# Patient Record
Sex: FEMALE | Race: BLACK OR AFRICAN AMERICAN | Employment: OTHER | URBAN - METROPOLITAN AREA
[De-identification: names, ages, dates, MRNs, and addresses within clinical notes are randomized per-mention and may not be internally consistent; named-entity substitution may affect disease eponyms.]

---

## 2017-01-12 PROBLEM — M17.0 OSTEOARTHRITIS OF BOTH KNEES, UNSPECIFIED OSTEOARTHRITIS TYPE: Status: ACTIVE | Noted: 2017-01-12

## 2017-09-01 PROCEDURE — 83970 ASSAY OF PARATHORMONE: CPT | Performed by: INTERNAL MEDICINE

## 2017-09-19 PROBLEM — M79.10 MYALGIA: Status: ACTIVE | Noted: 2017-09-19

## 2018-04-09 PROCEDURE — 86704 HEP B CORE ANTIBODY TOTAL: CPT | Performed by: INTERNAL MEDICINE

## 2018-04-09 PROCEDURE — 80074 ACUTE HEPATITIS PANEL: CPT | Performed by: INTERNAL MEDICINE

## 2018-04-09 PROCEDURE — 36415 COLL VENOUS BLD VENIPUNCTURE: CPT | Performed by: INTERNAL MEDICINE

## 2018-09-06 PROBLEM — M05.79 RHEUMATOID ARTHRITIS INVOLVING MULTIPLE SITES WITH POSITIVE RHEUMATOID FACTOR (HCC): Status: ACTIVE | Noted: 2018-09-06

## 2019-11-19 PROBLEM — K86.2 PANCREATIC CYST: Status: ACTIVE | Noted: 2019-11-19

## 2020-07-22 NOTE — PAT NURSING NOTE
Patient states she will not be able to make it to [facility] before 2:30 today. Patient will require rapid covid test done prior to procedure start.

## 2020-07-23 ENCOUNTER — ANESTHESIA EVENT (OUTPATIENT)
Dept: ENDOSCOPY | Facility: HOSPITAL | Age: 75
End: 2020-07-23
Payer: MEDICARE

## 2020-07-23 ENCOUNTER — ANESTHESIA (OUTPATIENT)
Dept: ENDOSCOPY | Facility: HOSPITAL | Age: 75
End: 2020-07-23
Payer: MEDICARE

## 2020-07-23 ENCOUNTER — HOSPITAL ENCOUNTER (OUTPATIENT)
Facility: HOSPITAL | Age: 75
Setting detail: HOSPITAL OUTPATIENT SURGERY
Discharge: HOME | End: 2020-07-23
Attending: INTERNAL MEDICINE | Admitting: INTERNAL MEDICINE
Payer: MEDICARE

## 2020-07-23 DIAGNOSIS — K86.2 PANCREAS CYST: ICD-10-CM

## 2020-07-23 LAB
GLUCOSE BLDC GLUCOMTR-MCNC: 108 MG/DL (ref 70–99)
SARS-COV-2 RNA RESP QL NAA+PROBE: NOT DETECTED

## 2020-07-23 PROCEDURE — 82962 GLUCOSE BLOOD TEST: CPT

## 2020-07-23 PROCEDURE — 0F9G8ZX DRAINAGE OF PANCREAS, VIA NATURAL OR ARTIFICIAL OPENING ENDOSCOPIC, DIAGNOSTIC: ICD-10-PCS | Performed by: INTERNAL MEDICINE

## 2020-07-23 RX ORDER — SODIUM CHLORIDE, SODIUM LACTATE, POTASSIUM CHLORIDE, CALCIUM CHLORIDE 600; 310; 30; 20 MG/100ML; MG/100ML; MG/100ML; MG/100ML
INJECTION, SOLUTION INTRAVENOUS CONTINUOUS
Status: DISCONTINUED | OUTPATIENT
Start: 2020-07-23 | End: 2020-07-23

## 2020-07-23 RX ORDER — LIDOCAINE HYDROCHLORIDE 10 MG/ML
INJECTION, SOLUTION EPIDURAL; INFILTRATION; INTRACAUDAL; PERINEURAL AS NEEDED
Status: DISCONTINUED | OUTPATIENT
Start: 2020-07-23 | End: 2020-07-23 | Stop reason: SURG

## 2020-07-23 RX ORDER — NALOXONE HYDROCHLORIDE 0.4 MG/ML
80 INJECTION, SOLUTION INTRAMUSCULAR; INTRAVENOUS; SUBCUTANEOUS AS NEEDED
Status: DISCONTINUED | OUTPATIENT
Start: 2020-07-23 | End: 2020-07-23

## 2020-07-23 RX ORDER — DEXTROSE MONOHYDRATE 25 G/50ML
50 INJECTION, SOLUTION INTRAVENOUS
Status: DISCONTINUED | OUTPATIENT
Start: 2020-07-23 | End: 2020-07-23

## 2020-07-23 RX ADMIN — SODIUM CHLORIDE, SODIUM LACTATE, POTASSIUM CHLORIDE, CALCIUM CHLORIDE: 600; 310; 30; 20 INJECTION, SOLUTION INTRAVENOUS at 10:54:00

## 2020-07-23 RX ADMIN — LIDOCAINE HYDROCHLORIDE 50 MG: 10 INJECTION, SOLUTION EPIDURAL; INFILTRATION; INTRACAUDAL; PERINEURAL at 10:33:00

## 2020-07-23 NOTE — OPERATIVE REPORT
OPERATIVE REPORT   PATIENT NAME: Tyler Connors  MRN: [id number]  DATE OF OPERATION: 7/23/2020  PREOPERATIVE DIAGNOSIS:   1. Enlarging pancreas neck cyst  POSTOPERATIVE DIAGNOSES:  1. Multiseptated microcystic lesion in the neck of the pancreas.   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 No solid component was seen or thickening of the wall. The main pancreas duct in the bile duct did not appear to be dilated. The gallbladder contained a mobile nonshadowing stone.   The

## 2020-07-23 NOTE — ANESTHESIA PREPROCEDURE EVALUATION
Anesthesia PreOp Note    HPI:     Sedrick Aquino is a 76year old female who presents for preoperative consultation requested by: Grey Aceves MD    Date of Surgery: 7/23/2020    Procedure(s):  ENDOSCOPIC ULTRASOUND (EUS)  Indication: Pancreas cyst Unspecified essential hypertension        Past Surgical History:   Procedure Laterality Date   • COLONOSCOPY  06/2013    osis   • EGD  2007, 2011, 2013    [facility] and esophagitis   • OTHER SURGICAL HISTORY  1986    Hysterectomy    • OTHER SURGICAL HISTORY  09/1 Continuous, Lennox Riggers, MD    No current [facility]-ordered outpatient medications on file. No Known Allergies    History reviewed. No pertinent family history.   Social History    Socioeconomic History      Marital status:       Spouse name: Not 06/01/2020          Vital Signs: Body mass index is 31.89 kg/m². height is 1.6 m (5' 3\") and weight is 81.6 kg (180 lb).     07/22/20  1321   Weight: 81.6 kg (180 lb)   Height: 1.6 m (5' 3\")        Anesthesia Evaluation     Patient summary reviewed and

## 2020-07-23 NOTE — ANESTHESIA POSTPROCEDURE EVALUATION
Patient: Emmie New    Procedure Summary     Date:  07/23/20 Room / Location:  [address] [place] [address] ENDOSCOPY 01 / [address] [place] [address] ENDOSCOPY    Anesthesia Start:  0976 Anesthesia Stop:  5490    Procedure:  ENDOSCOPIC ULTRASOUND (EUS) (N/A ) Diagnosis:       Pancreas cyst      (ch

## 2020-07-24 VITALS
HEIGHT: 63 IN | HEART RATE: 66 BPM | DIASTOLIC BLOOD PRESSURE: 84 MMHG | SYSTOLIC BLOOD PRESSURE: 145 MMHG | BODY MASS INDEX: 31.89 KG/M2 | OXYGEN SATURATION: 98 % | TEMPERATURE: 98 F | WEIGHT: 180 LBS | RESPIRATION RATE: 20 BRPM

## 2020-07-28 NOTE — PROGRESS NOTES
Review results with RA.  CEA less than 0.2  Amylase 114    It was discussed with the patient the possible need for ERCP, sphincterotomy and biopsy of the intraduodenal portion of the ampulla.   (If note patient liver enzymes in June of this year were normal

## 2020-07-29 NOTE — PROGRESS NOTES
Please contact patient to schedule follow up office visit with Dr. Zelda Andersen. Thanks.      7/29/2020  [address] [facility] [id number]    Dear Allie Corona,     Here are the biopsy/pathology findings from your recent upper endoscopic

## 2020-07-30 NOTE — PROGRESS NOTES
Pt is scheduled for follow up   Future Appointments  8/12/2020  11:10 AM   Vikram Singh MD        [facility]     [facility]  9/25/2020  2:40 PM    Bettie Paul MD        [address] [facility]

## 2021-03-04 DIAGNOSIS — Z23 NEED FOR VACCINATION: ICD-10-CM

## 2023-02-06 ENCOUNTER — PATIENT OUTREACH (OUTPATIENT)
Dept: CASE MANAGEMENT | Age: 78
End: 2023-02-06

## 2023-02-06 NOTE — PROCEDURES
The office order for PCP request is Approved and finalized on February 6, 2023.     Thanks,  [facility] [employer]

## (undated) DEVICE — MEDI-VAC NON-CONDUCTIVE SUCTION TUBING 6MM X 1.8M (6FT.) L: Brand: CARDINAL HEALTH

## (undated) DEVICE — CONMED SCOPE SAVER BITE BLOCK, 20X27 MM: Brand: SCOPE SAVER

## (undated) DEVICE — Device: Brand: CUSTOM PROCEDURE KIT

## (undated) DEVICE — SINGLE-USE BIOPSY FORCEPS: Brand: RADIAL JAW 4

## (undated) DEVICE — CANNULA NASAL 02/C02 ADULT

## (undated) DEVICE — Device

## (undated) DEVICE — Device: Brand: DEFENDO AIR/WATER/SUCTION AND BIOPSY VALVE

## (undated) DEVICE — ECHOTIP ULTRA, ENDOSCOPIC ULTRASOUND NEEDLE: Brand: ECHOTIP

## (undated) DEVICE — LINE MNTR ADLT SET O2 INTMD

## (undated) NOTE — LETTER
7/29/2020          [address] [facility] [id number]    Dear Olya Harmon,     Here are the biopsy/pathology findings from your recent upper endoscopic ultrasound.      Pancreas neck cyst with endoscopic evaluation and fluid anal